# Patient Record
Sex: FEMALE | Race: WHITE
[De-identification: names, ages, dates, MRNs, and addresses within clinical notes are randomized per-mention and may not be internally consistent; named-entity substitution may affect disease eponyms.]

---

## 2017-12-01 ENCOUNTER — HOSPITAL ENCOUNTER (OUTPATIENT)
Dept: HOSPITAL 45 - C.LAB1850 | Age: 37
Discharge: HOME | End: 2017-12-01
Attending: SPECIALIST
Payer: COMMERCIAL

## 2017-12-01 DIAGNOSIS — Z11.3: ICD-10-CM

## 2017-12-01 DIAGNOSIS — Z11.59: ICD-10-CM

## 2017-12-01 DIAGNOSIS — Z01.83: ICD-10-CM

## 2017-12-01 DIAGNOSIS — Z31.41: Primary | ICD-10-CM

## 2017-12-01 DIAGNOSIS — E28.2: ICD-10-CM

## 2017-12-01 DIAGNOSIS — Z13.21: ICD-10-CM

## 2017-12-01 DIAGNOSIS — Z11.4: ICD-10-CM

## 2017-12-01 LAB
EOSINOPHIL NFR BLD AUTO: 233 K/UL (ref 130–400)
HCT VFR BLD CALC: 41.4 % (ref 37–47)
MCH RBC QN AUTO: 30.5 PG (ref 25–34)
MCHC RBC AUTO-ENTMCNC: 33.3 G/DL (ref 32–36)
MCV RBC AUTO: 91.6 FL (ref 80–100)
PMV BLD AUTO: 11.4 FL (ref 7.4–10.4)
RBC # BLD AUTO: 4.52 M/UL (ref 4.2–5.4)
RUBELLA SCREEN IGG (AT CCH): (no result)
WBC # BLD AUTO: 7.5 K/UL (ref 4.8–10.8)

## 2017-12-06 ENCOUNTER — HOSPITAL ENCOUNTER (OUTPATIENT)
Dept: HOSPITAL 45 - C.RAD | Age: 37
Discharge: HOME | End: 2017-12-06
Attending: OBSTETRICS & GYNECOLOGY
Payer: COMMERCIAL

## 2017-12-06 DIAGNOSIS — Z31.41: Primary | ICD-10-CM

## 2017-12-06 NOTE — DIAGNOSTIC IMAGING REPORT
HYSTEROSALPINGOGRAM



CLINICAL HISTORY: 37 years-old Female with FERTILITY TESTING *DR PEREA DOING*. 

Infertility



FLUOROSCOPY TIME: 0.3 minutes.  2 images.



TECHNIQUE: Hysterosalpingogram performed in conjunction with Dr. Perea of the

OB/GYN department.  Radiology was present to provide fluoroscopy.  



FINDINGS: Dr. Perea was unable to inject contrast into the cervical canal or

endometrial cavity. Surgical clip projects over the right hemipelvis.



IMPRESSION:  

Limited study without contrast injection into the cervix or endometrial canal.







The above report was generated using voice recognition software. It may contain

grammatical, syntax or spelling errors.







Electronically signed by:  Cullen Cool M.D.

12/6/2017 12:12 PM



Dictated Date/Time:  12/6/2017 12:10 PM

## 2017-12-06 NOTE — OPERATIVE REPORT
DATE OF OPERATION:  12/06/2017

 

PREOPERATIVE DIAGNOSIS:  Female infertility.

 

POSTOPERATIVE DIAGNOSIS:  Same.

 

PROCEDURE:

1.  Injection of x-ray contrast media for hysterosalpingogram.

2.  Aborted procedure.

 

SURGEON:  Bianca Haynes MD.

 

DESCRIPTION OF THE PROCEDURE:  The patient placed on the fluoroscopy table in

dorsolithotomy position.  Speculum placed, however, patient somewhat

intolerant.  Cervix prepped with Betadine x3.  Cervix grasped on its anterior

lip with an Allis clamp.  Gunbarrel uterine manipulator primed with x-ray

contrast media gently placed through the cervical os and the patient

attempted to be repositioned on the fluoroscopy table twice.  Each time the

device slipped and after the second time, the patient refused further

attempts at HSG.  The radiologist was present; however, x-ray contrast media

that was attempted to be injected was only in the vagina and therefore the

portion of the procedure did not produce any results.  All instruments had

been removed.  The patient tolerated the procedure well.

 

 

I attest to the content of the Intraoperative Record and any orders documented therein. Any exception
s are noted below.

## 2018-03-29 ENCOUNTER — HOSPITAL ENCOUNTER (OUTPATIENT)
Dept: HOSPITAL 45 - C.LAB1850 | Age: 38
Discharge: HOME | End: 2018-03-29
Attending: SPECIALIST
Payer: COMMERCIAL

## 2018-03-29 DIAGNOSIS — Z13.29: Primary | ICD-10-CM

## 2018-05-11 ENCOUNTER — HOSPITAL ENCOUNTER (OUTPATIENT)
Dept: HOSPITAL 45 - C.LAB1850 | Age: 38
Discharge: HOME | End: 2018-05-11
Attending: SPECIALIST
Payer: COMMERCIAL

## 2018-05-11 DIAGNOSIS — E55.9: ICD-10-CM

## 2018-05-11 DIAGNOSIS — Z31.41: Primary | ICD-10-CM

## 2018-05-11 LAB — LUTEINIZING HORMONE: 7.76 IU/L

## 2018-05-14 ENCOUNTER — HOSPITAL ENCOUNTER (OUTPATIENT)
Dept: HOSPITAL 45 - C.LAB1850 | Age: 38
Discharge: HOME | End: 2018-05-14
Attending: SPECIALIST
Payer: COMMERCIAL

## 2018-05-14 DIAGNOSIS — Z31.41: Primary | ICD-10-CM

## 2018-05-14 LAB
FOLLICLE STIMULAT HORMONE: 6.61 IU/L
LUTEINIZING HORMONE: 5.3 IU/L

## 2018-05-17 ENCOUNTER — HOSPITAL ENCOUNTER (OUTPATIENT)
Dept: HOSPITAL 45 - C.LAB1850 | Age: 38
Discharge: HOME | End: 2018-05-17
Attending: SPECIALIST
Payer: COMMERCIAL

## 2018-05-17 DIAGNOSIS — Z31.41: Primary | ICD-10-CM

## 2018-05-17 LAB — LUTEINIZING HORMONE: 66.24 IU/L
